# Patient Record
Sex: MALE | Race: WHITE | Employment: FULL TIME | URBAN - METROPOLITAN AREA
[De-identification: names, ages, dates, MRNs, and addresses within clinical notes are randomized per-mention and may not be internally consistent; named-entity substitution may affect disease eponyms.]

---

## 2020-11-19 ENCOUNTER — HOSPITAL ENCOUNTER (EMERGENCY)
Age: 30
Discharge: HOME | End: 2020-11-19
Attending: STUDENT IN AN ORGANIZED HEALTH CARE EDUCATION/TRAINING PROGRAM
Payer: COMMERCIAL

## 2020-11-19 VITALS
OXYGEN SATURATION: 99 % | HEIGHT: 74 IN | RESPIRATION RATE: 20 BRPM | BODY MASS INDEX: 25.03 KG/M2 | SYSTOLIC BLOOD PRESSURE: 136 MMHG | WEIGHT: 195 LBS | TEMPERATURE: 98.4 F | HEART RATE: 74 BPM | DIASTOLIC BLOOD PRESSURE: 88 MMHG

## 2020-11-19 DIAGNOSIS — U07.1 COVID-19: Primary | ICD-10-CM

## 2020-11-19 PROCEDURE — 99283 EMERGENCY DEPT VISIT LOW MDM: CPT

## 2020-11-19 NOTE — ED TRIAGE NOTES
Patient reports that he feels that he had a panic attack earlier before he left the house over being tested positive for Covid-19 on Saturday. Reports that he feels as if he is panicking himself, then gets short of breath, and anxious. Denies SI/HI.

## 2020-11-20 NOTE — ED PROVIDER NOTES
EMERGENCY DEPARTMENT HISTORY AND PHYSICAL EXAM      Date: 11/19/2020  Patient Name: Latanya Yoo    History of Presenting Illness     Chief Complaint   Patient presents with    Anxiety    Fatigue       History Provided By: Patient    HPI: Latanya Yoo, 27 y.o. male with No significant past medical history presents to the ED with cc of chest tightness, shortness of breath, anxiety. Patient was diagnosed with coronavirus approximately 6 days prior. Has been taking antibiotics, steroids, and vitamins as provided by his primary care physician. Patient states he occasionally feels short of breath but generally can manage around house. Denies any fevers chills, denies any chest pains, denies any abdominal pain nausea vomiting. Patient states he has been feeling very anxious and wanted to be reevaluated today in the emergency department. There are no other complaints, changes, or physical findings at this time. PCP: None    No current facility-administered medications on file prior to encounter. No current outpatient medications on file prior to encounter. Past History     Past Medical History:  No past medical history on file. Past Surgical History:  No past surgical history on file. Family History:  No family history on file. Social History:  Social History     Tobacco Use    Smoking status: Not on file   Substance Use Topics    Alcohol use: Not on file    Drug use: Not on file       Allergies:  No Known Allergies      Review of Systems     Review of Systems   Constitutional: Negative for activity change, chills and fever. HENT: Negative for congestion and sore throat. Eyes: Negative for photophobia and visual disturbance. Respiratory: Positive for cough, chest tightness and shortness of breath. Cardiovascular: Negative for chest pain. Gastrointestinal: Negative for abdominal pain and vomiting. Genitourinary: Negative for dysuria and frequency.    Musculoskeletal: Positive for myalgias. Negative for arthralgias, back pain, neck pain and neck stiffness. Skin: Negative for color change. Neurological: Negative for dizziness, light-headedness and headaches. Physical Exam     Physical Exam  Constitutional:       General: He is not in acute distress. Appearance: Normal appearance. He is normal weight. He is not toxic-appearing. HENT:      Head: Normocephalic. Nose: Nose normal.      Mouth/Throat:      Mouth: Mucous membranes are moist.   Eyes:      Extraocular Movements: Extraocular movements intact. Conjunctiva/sclera: Conjunctivae normal.   Neck:      Musculoskeletal: Normal range of motion. No neck rigidity. Cardiovascular:      Rate and Rhythm: Normal rate. Pulmonary:      Effort: Pulmonary effort is normal.      Breath sounds: Normal breath sounds. Musculoskeletal:         General: No swelling or tenderness. Skin:     General: Skin is warm and dry. Capillary Refill: Capillary refill takes less than 2 seconds. Neurological:      General: No focal deficit present. Mental Status: He is alert and oriented to person, place, and time. Psychiatric:         Mood and Affect: Mood normal.         Diagnostic Study Results     Labs -   No results found for this or any previous visit (from the past 12 hour(s)). Radiologic Studies -   @lastxrresult@  CT Results  (Last 48 hours)    None        CXR Results  (Last 48 hours)    None            Medical Decision Making   I am the first provider for this patient. I reviewed the vital signs, available nursing notes, past medical history, past surgical history, family history and social history. Vital Signs-Reviewed the patient's vital signs.   Patient Vitals for the past 12 hrs:   Temp Pulse Resp BP SpO2   11/19/20 1825 98.4 °F (36.9 °C) 99 18 138/83 97 %       Records Reviewed: Nursing Notes    The patient presents with chest tightness, shortness of breath with a differential diagnosis of Covid infection, bronchitis, anxiety reaction      Provider Notes (Medical Decision Making):     MDM  Number of Diagnoses or Management Options  COVID-23   40-year-old male, recently diagnosed with Covid, presents emergency department for chest tightness, shortness of breath. Physical exam shows patient no distress, afebrile, saturating 97% on room air. Sounds clear to auscultation bilateral    This time do not suspect any significant complications from Covid. Patient reassured, is currently taking steroids as prescribed by PMD.    We will discharge patient home, instructed to follow-up with primary care physician as needed        ED Course:   Initial assessment performed. The patients presenting problems have been discussed, and they are in agreement with the care plan formulated and outlined with them. I have encouraged them to ask questions as they arise throughout their visit. PROCEDURES  Procedures         PLAN:  1. There are no discharge medications for this patient. 2.   Follow-up Information     Follow up With Specialties Details Why Contact Info    Primary care physician   As needed         Return to ED if worse     Diagnosis     Clinical Impression:   1.  COVID-19

## 2020-11-20 NOTE — ED NOTES
7:21 PM    Patient reports that his anxiety is reduced since leaving home and denies SI/HI at this time. Patient is awake, cooperative, pleasant. Will cont to monitor.